# Patient Record
Sex: MALE | Race: WHITE | Employment: UNEMPLOYED | URBAN - METROPOLITAN AREA
[De-identification: names, ages, dates, MRNs, and addresses within clinical notes are randomized per-mention and may not be internally consistent; named-entity substitution may affect disease eponyms.]

---

## 2022-06-28 ENCOUNTER — HOSPITAL ENCOUNTER (EMERGENCY)
Age: 47
Discharge: HOME | End: 2022-06-29
Payer: COMMERCIAL

## 2022-06-28 DIAGNOSIS — R19.7 DIARRHEA, UNSPECIFIED TYPE: ICD-10-CM

## 2022-06-28 DIAGNOSIS — R73.9 HYPERGLYCEMIA: Primary | ICD-10-CM

## 2022-06-28 LAB
A/G RATIO: 0.9 (ref 1.1–2.2)
ALBUMIN SERPL-MCNC: 3.6 G/DL (ref 3.4–5)
ALP BLD-CCNC: 136 U/L (ref 40–129)
ALT SERPL-CCNC: 15 U/L (ref 10–40)
ANION GAP SERPL CALCULATED.3IONS-SCNC: 17 MMOL/L (ref 3–16)
AST SERPL-CCNC: 13 U/L (ref 15–37)
BASE EXCESS VENOUS: -1.6 MMOL/L
BASOPHILS ABSOLUTE: 0.1 K/UL (ref 0–0.2)
BASOPHILS RELATIVE PERCENT: 0.5 %
BETA-HYDROXYBUTYRATE: 0.57 MMOL/L (ref 0–0.27)
BILIRUB SERPL-MCNC: 0.8 MG/DL (ref 0–1)
BILIRUBIN URINE: NEGATIVE
BLOOD, URINE: NEGATIVE
BUN BLDV-MCNC: 20 MG/DL (ref 7–20)
CALCIUM SERPL-MCNC: 9.1 MG/DL (ref 8.3–10.6)
CARBOXYHEMOGLOBIN: 4.4 %
CHLORIDE BLD-SCNC: 87 MMOL/L (ref 99–110)
CLARITY: CLEAR
CO2: 18 MMOL/L (ref 21–32)
COLOR: YELLOW
CREAT SERPL-MCNC: 1 MG/DL (ref 0.9–1.3)
EOSINOPHILS ABSOLUTE: 0.1 K/UL (ref 0–0.6)
EOSINOPHILS RELATIVE PERCENT: 0.6 %
GFR AFRICAN AMERICAN: >60
GFR NON-AFRICAN AMERICAN: >60
GLUCOSE BLD-MCNC: 674 MG/DL (ref 70–99)
GLUCOSE URINE: >=1000 MG/DL
HCO3 VENOUS: 22 MMOL/L (ref 23–29)
HCT VFR BLD CALC: 47.6 % (ref 40.5–52.5)
HEMOGLOBIN: 16.2 G/DL (ref 13.5–17.5)
KETONES, URINE: ABNORMAL MG/DL
LEUKOCYTE ESTERASE, URINE: NEGATIVE
LIPASE: 34 U/L (ref 13–60)
LYMPHOCYTES ABSOLUTE: 1.5 K/UL (ref 1–5.1)
LYMPHOCYTES RELATIVE PERCENT: 12.6 %
MCH RBC QN AUTO: 31 PG (ref 26–34)
MCHC RBC AUTO-ENTMCNC: 34 G/DL (ref 31–36)
MCV RBC AUTO: 91.2 FL (ref 80–100)
METHEMOGLOBIN VENOUS: 0.5 %
MICROSCOPIC EXAMINATION: ABNORMAL
MONOCYTES ABSOLUTE: 1 K/UL (ref 0–1.3)
MONOCYTES RELATIVE PERCENT: 8.9 %
NEUTROPHILS ABSOLUTE: 9 K/UL (ref 1.7–7.7)
NEUTROPHILS RELATIVE PERCENT: 77.4 %
NITRITE, URINE: NEGATIVE
O2 SAT, VEN: 67 %
O2 THERAPY: ABNORMAL
PCO2, VEN: 34.4 MMHG (ref 40–50)
PDW BLD-RTO: 13.2 % (ref 12.4–15.4)
PH UA: 5 (ref 5–8)
PH VENOUS: 7.42 (ref 7.35–7.45)
PLATELET # BLD: 234 K/UL (ref 135–450)
PMV BLD AUTO: 9.1 FL (ref 5–10.5)
PO2, VEN: 33 MMHG
POTASSIUM SERPL-SCNC: 4.2 MMOL/L (ref 3.5–5.1)
PROTEIN UA: NEGATIVE MG/DL
RBC # BLD: 5.22 M/UL (ref 4.2–5.9)
SODIUM BLD-SCNC: 122 MMOL/L (ref 136–145)
SPECIFIC GRAVITY UA: 1.04 (ref 1–1.03)
TCO2 CALC VENOUS: 23 MMOL/L
TOTAL PROTEIN: 7.4 G/DL (ref 6.4–8.2)
TROPONIN: <0.01 NG/ML
URINE REFLEX TO CULTURE: ABNORMAL
URINE TYPE: ABNORMAL
UROBILINOGEN, URINE: 0.2 E.U./DL
WBC # BLD: 11.6 K/UL (ref 4–11)

## 2022-06-28 PROCEDURE — 82803 BLOOD GASES ANY COMBINATION: CPT

## 2022-06-28 PROCEDURE — 85025 COMPLETE CBC W/AUTO DIFF WBC: CPT

## 2022-06-28 PROCEDURE — 2580000003 HC RX 258: Performed by: PHYSICIAN ASSISTANT

## 2022-06-28 PROCEDURE — 81003 URINALYSIS AUTO W/O SCOPE: CPT

## 2022-06-28 PROCEDURE — 99284 EMERGENCY DEPT VISIT MOD MDM: CPT

## 2022-06-28 PROCEDURE — 96374 THER/PROPH/DIAG INJ IV PUSH: CPT

## 2022-06-28 PROCEDURE — 82010 KETONE BODYS QUAN: CPT

## 2022-06-28 PROCEDURE — 84484 ASSAY OF TROPONIN QUANT: CPT

## 2022-06-28 PROCEDURE — 80053 COMPREHEN METABOLIC PANEL: CPT

## 2022-06-28 PROCEDURE — 83690 ASSAY OF LIPASE: CPT

## 2022-06-28 PROCEDURE — 6370000000 HC RX 637 (ALT 250 FOR IP): Performed by: PHYSICIAN ASSISTANT

## 2022-06-28 PROCEDURE — 36415 COLL VENOUS BLD VENIPUNCTURE: CPT

## 2022-06-28 PROCEDURE — 93005 ELECTROCARDIOGRAM TRACING: CPT | Performed by: PHYSICIAN ASSISTANT

## 2022-06-28 RX ORDER — 0.9 % SODIUM CHLORIDE 0.9 %
1000 INTRAVENOUS SOLUTION INTRAVENOUS ONCE
Status: COMPLETED | OUTPATIENT
Start: 2022-06-28 | End: 2022-06-29

## 2022-06-28 RX ADMIN — SODIUM CHLORIDE 1000 ML: 9 INJECTION, SOLUTION INTRAVENOUS at 23:03

## 2022-06-28 RX ADMIN — SODIUM CHLORIDE 1000 ML: 9 INJECTION, SOLUTION INTRAVENOUS at 23:09

## 2022-06-28 RX ADMIN — SODIUM CHLORIDE 1000 ML: 9 INJECTION, SOLUTION INTRAVENOUS at 23:43

## 2022-06-28 RX ADMIN — INSULIN HUMAN 10 UNITS: 100 INJECTION, SOLUTION PARENTERAL at 23:41

## 2022-06-28 ASSESSMENT — PAIN - FUNCTIONAL ASSESSMENT: PAIN_FUNCTIONAL_ASSESSMENT: 0-10

## 2022-06-28 ASSESSMENT — PAIN DESCRIPTION - PAIN TYPE: TYPE: ACUTE PAIN

## 2022-06-28 ASSESSMENT — PAIN DESCRIPTION - DESCRIPTORS: DESCRIPTORS: DISCOMFORT

## 2022-06-28 ASSESSMENT — PAIN DESCRIPTION - LOCATION: LOCATION: ABDOMEN

## 2022-06-28 ASSESSMENT — PAIN SCALES - GENERAL: PAINLEVEL_OUTOF10: 8

## 2022-06-29 VITALS
WEIGHT: 199.52 LBS | HEART RATE: 85 BPM | RESPIRATION RATE: 18 BRPM | TEMPERATURE: 98.4 F | BODY MASS INDEX: 27.02 KG/M2 | DIASTOLIC BLOOD PRESSURE: 92 MMHG | SYSTOLIC BLOOD PRESSURE: 147 MMHG | HEIGHT: 72 IN | OXYGEN SATURATION: 98 %

## 2022-06-29 LAB
ANION GAP SERPL CALCULATED.3IONS-SCNC: 12 MMOL/L (ref 3–16)
BUN BLDV-MCNC: 18 MG/DL (ref 7–20)
CALCIUM SERPL-MCNC: 7.9 MG/DL (ref 8.3–10.6)
CHLORIDE BLD-SCNC: 103 MMOL/L (ref 99–110)
CO2: 17 MMOL/L (ref 21–32)
CREAT SERPL-MCNC: 0.8 MG/DL (ref 0.9–1.3)
EKG ATRIAL RATE: 114 BPM
EKG DIAGNOSIS: NORMAL
EKG P AXIS: 82 DEGREES
EKG P-R INTERVAL: 142 MS
EKG Q-T INTERVAL: 334 MS
EKG QRS DURATION: 74 MS
EKG QTC CALCULATION (BAZETT): 460 MS
EKG R AXIS: 65 DEGREES
EKG T AXIS: 42 DEGREES
EKG VENTRICULAR RATE: 114 BPM
GFR AFRICAN AMERICAN: >60
GFR NON-AFRICAN AMERICAN: >60
GLUCOSE BLD-MCNC: 292 MG/DL (ref 70–99)
GLUCOSE BLD-MCNC: 302 MG/DL (ref 70–99)
PERFORMED ON: ABNORMAL
POTASSIUM REFLEX MAGNESIUM: 3.6 MMOL/L (ref 3.5–5.1)
SODIUM BLD-SCNC: 132 MMOL/L (ref 136–145)

## 2022-06-29 PROCEDURE — 36415 COLL VENOUS BLD VENIPUNCTURE: CPT

## 2022-06-29 PROCEDURE — 80048 BASIC METABOLIC PNL TOTAL CA: CPT

## 2022-06-29 PROCEDURE — 93010 ELECTROCARDIOGRAM REPORT: CPT | Performed by: INTERNAL MEDICINE

## 2022-06-29 ASSESSMENT — ENCOUNTER SYMPTOMS
NAUSEA: 0
VOMITING: 0
SHORTNESS OF BREATH: 0
COLOR CHANGE: 0
ABDOMINAL PAIN: 0
DIARRHEA: 1
BACK PAIN: 0

## 2022-06-29 NOTE — ED PROVIDER NOTES
Gastrointestinal: Positive for diarrhea. Negative for abdominal pain, nausea and vomiting. Genitourinary: Negative for dysuria. Musculoskeletal: Negative for back pain. Skin: Negative for color change, rash and wound. Neurological: Positive for light-headedness. Psychiatric/Behavioral: Negative for agitation, behavioral problems and confusion. Except as noted above the remainder of the review of systems was reviewed and negative. PAST MEDICAL HISTORY         Diagnosis Date    Nicotine abuse     Pulmonary nodule     Right shoulder pain     SOB (shortness of breath)        SURGICAL HISTORY     History reviewed. No pertinent surgical history. CURRENT MEDICATIONS       Discharge Medication List as of 6/29/2022  2:07 AM      CONTINUE these medications which have NOT CHANGED    Details   albuterol (PROVENTIL HFA;VENTOLIN HFA) 108 (90 BASE) MCG/ACT inhaler Inhale 2 puffs into the lungs every 6 hours as needed for WheezingHistorical Med      levothyroxine (SYNTHROID) 88 MCG tablet Take 88 mcg by mouth DailyHistorical Med      Amoxicillin-Pot Clavulanate (AUGMENTIN PO) Take by mouthHistorical Med             ALLERGIES     Ciprofloxacin, Ketorolac tromethamine, Cephalexin, Tramadol, Ibuprofen, and Other    FAMILY HISTORY     History reviewed. No pertinent family history. No family status information on file. SOCIAL HISTORY      reports that he has been smoking. He has never used smokeless tobacco. He reports previous alcohol use. PHYSICAL EXAM    (up to 7 for level 4, 8 or more for level 5)     ED Triage Vitals [06/28/22 2121]   BP Temp Temp Source Heart Rate Resp SpO2 Height Weight   (!) 138/90 98.4 °F (36.9 °C) Oral (!) 107 18 100 % 6' (1.829 m) 199 lb 8.3 oz (90.5 kg)       Physical Exam  Vitals and nursing note reviewed. Constitutional:       Appearance: Normal appearance. HENT:      Head: Normocephalic and atraumatic.       Mouth/Throat:      Mouth: Mucous membranes are moist. Eyes:      Pupils: Pupils are equal, round, and reactive to light. Cardiovascular:      Rate and Rhythm: Tachycardia present. Pulmonary:      Effort: Pulmonary effort is normal. No respiratory distress. Abdominal:      Tenderness: There is no abdominal tenderness. There is no guarding or rebound. Musculoskeletal:         General: No swelling. Normal range of motion. Cervical back: Normal range of motion. Skin:     General: Skin is warm. Neurological:      General: No focal deficit present. Mental Status: He is alert and oriented to person, place, and time. Cranial Nerves: No cranial nerve deficit. Motor: No weakness. Gait: Gait normal.   Psychiatric:         Mood and Affect: Mood normal.         Behavior: Behavior normal.         DIAGNOSTIC RESULTS     EKG: All EKG's are interpreted by SILKE Olguin in the absence of a cardiologist.    EKG interpreted by myself - please refer to attending physician's note for complete EKG interpretation:    Rhythm: sinus rhythm   No evidence of acute ischemia or injury.     LABS:  Labs Reviewed   CBC WITH AUTO DIFFERENTIAL - Abnormal; Notable for the following components:       Result Value    WBC 11.6 (*)     Neutrophils Absolute 9.0 (*)     All other components within normal limits   COMPREHENSIVE METABOLIC PANEL - Abnormal; Notable for the following components:    Sodium 122 (*)     Chloride 87 (*)     CO2 18 (*)     Anion Gap 17 (*)     Glucose 674 (*)     Albumin/Globulin Ratio 0.9 (*)     Alkaline Phosphatase 136 (*)     AST 13 (*)     All other components within normal limits    Narrative:     Vivienne Mann tel. [phone],  Chemistry results called to and read back by Darleen Peres RN, 06/28/2022  22:40, by [address] - Abnormal; Notable for the following components:    Glucose, Ur >=1000 (*)     Ketones, Urine TRACE (*)     All other components within normal limits   BLOOD GAS, VENOUS - Abnormal; Notable for the following components:    pCO2, Memo 34.4 (*)     HCO3, Venous 22 (*)     All other components within normal limits   BETA-HYDROXYBUTYRATE - Abnormal; Notable for the following components:    Beta-Hydroxybutyrate 0.57 (*)     All other components within normal limits   BASIC METABOLIC PANEL W/ REFLEX TO MG FOR LOW K - Abnormal; Notable for the following components:    Sodium 132 (*)     CO2 17 (*)     Glucose 302 (*)     CREATININE 0.8 (*)     Calcium 7.9 (*)     All other components within normal limits   POCT GLUCOSE - Abnormal; Notable for the following components:    POC Glucose 292 (*)     All other components within normal limits   GASTROINTESTINAL PANEL, MOLECULAR   LIPASE    Narrative:     Misha Hobbs tel. [phone],  Chemistry results called to and read back by Vivek Barfield RN, 06/28/2022  22:40, by [facility]   TROPONIN       All other labs were within normal range or not returned as of this dictation. EMERGENCY DEPARTMENT COURSE and DIFFERENTIAL DIAGNOSIS/MDM:   Vitals:    Vitals:    06/28/22 2121 06/29/22 0159 06/29/22 0205   BP: (!) 138/90  (!) 147/92   Pulse: (!) 107 96 85   Resp: 18  18   Temp: 98.4 °F (36.9 °C)     TempSrc: Oral     SpO2: 100%  98%   Weight: 199 lb 8.3 oz (90.5 kg)     Height: 6' (1.829 m)       Patient's pulse initially 107 on recheck is 80. Afebrile not hypoxic. Complaining of frequent persistent diarrhea. His blood sugar was found to be over 600. His anion gap was elevated at 17 and CO2 was 18. Blood pH was normal.  He did have trace ketones in his urine. He was given fluids. Advised patient that he is showing signs of DKA and severe dehydration and that he should be admitted to the hospital.  The patient does not want to stay in the hospital despite my explanation that he can be come very sick and die or suffer permanent disability. He continues to wish to leave. I did repeat his BMP with an improved anion gap of 12. Blood sugars come down to 300. We will reinitiate his metformin instruct him to follow-up with primary care and return for new, worsening or other concerns. CONSULTS:  None    PROCEDURES:  Procedures      FINAL IMPRESSION      1. Hyperglycemia    2.  Diarrhea, unspecified type          DISPOSITION/PLAN   DISPOSITION Decision To Discharge 06/29/2022 01:57:21 AM      PATIENT REFERRED TO:  [facility]) Pre-Services  [phone]          DISCHARGE MEDICATIONS:  Discharge Medication List as of 6/29/2022  2:07 AM      START taking these medications    Details   metFORMIN (GLUCOPHAGE) 500 MG tablet Take 1 tablet by mouth 2 times daily (with meals), Disp-60 tablet, R-0Print             (Please note that portions of this note were completed with a voice recognition program.  Efforts were made to edit the dictations but occasionally words are mis-transcribed.)    Joni Welsh, [address], [place]  06/29/22 6386

## 2022-10-28 ENCOUNTER — APPOINTMENT (OUTPATIENT)
Dept: GENERAL RADIOLOGY | Age: 47
End: 2022-10-28
Payer: COMMERCIAL

## 2022-10-28 ENCOUNTER — HOSPITAL ENCOUNTER (EMERGENCY)
Age: 47
Discharge: HOME | End: 2022-10-28
Payer: COMMERCIAL

## 2022-10-28 VITALS
HEART RATE: 95 BPM | HEIGHT: 72 IN | WEIGHT: 210.1 LBS | SYSTOLIC BLOOD PRESSURE: 138 MMHG | DIASTOLIC BLOOD PRESSURE: 87 MMHG | BODY MASS INDEX: 28.46 KG/M2 | OXYGEN SATURATION: 98 % | TEMPERATURE: 97.2 F | RESPIRATION RATE: 16 BRPM

## 2022-10-28 DIAGNOSIS — S62.339A CLOSED BOXER'S FRACTURE, INITIAL ENCOUNTER: Primary | ICD-10-CM

## 2022-10-28 PROCEDURE — 99283 EMERGENCY DEPT VISIT LOW MDM: CPT

## 2022-10-28 PROCEDURE — 73130 X-RAY EXAM OF HAND: CPT

## 2022-10-28 PROCEDURE — 26750 TREAT FINGER FRACTURE EACH: CPT

## 2022-10-28 ASSESSMENT — PAIN DESCRIPTION - LOCATION: LOCATION: HAND

## 2022-10-28 ASSESSMENT — PAIN SCALES - GENERAL: PAINLEVEL_OUTOF10: 10

## 2022-10-28 ASSESSMENT — LIFESTYLE VARIABLES: HOW OFTEN DO YOU HAVE A DRINK CONTAINING ALCOHOL: NEVER

## 2022-10-28 ASSESSMENT — PAIN DESCRIPTION - DESCRIPTORS: DESCRIPTORS: ACHING

## 2022-10-28 ASSESSMENT — PAIN DESCRIPTION - ORIENTATION: ORIENTATION: RIGHT

## 2022-10-28 ASSESSMENT — PAIN - FUNCTIONAL ASSESSMENT: PAIN_FUNCTIONAL_ASSESSMENT: 0-10
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
## 2022-11-01 ENCOUNTER — TELEPHONE (OUTPATIENT)
Dept: ORTHOPEDIC SURGERY | Age: 47
End: 2022-11-01

## 2022-11-01 ENCOUNTER — OFFICE VISIT (OUTPATIENT)
Dept: ORTHOPEDIC SURGERY | Age: 47
End: 2022-11-01
Payer: COMMERCIAL

## 2022-11-01 VITALS — WEIGHT: 212.6 LBS | BODY MASS INDEX: 28.79 KG/M2 | HEIGHT: 72 IN | RESPIRATION RATE: 16 BRPM

## 2022-11-01 DIAGNOSIS — S60.221A CONTUSION OF RIGHT HAND, INITIAL ENCOUNTER: Primary | ICD-10-CM

## 2022-11-01 PROCEDURE — G8419 CALC BMI OUT NRM PARAM NOF/U: HCPCS | Performed by: ORTHOPAEDIC SURGERY

## 2022-11-01 PROCEDURE — 99203 OFFICE O/P NEW LOW 30 MIN: CPT | Performed by: ORTHOPAEDIC SURGERY

## 2022-11-01 PROCEDURE — G8427 DOCREV CUR MEDS BY ELIG CLIN: HCPCS | Performed by: ORTHOPAEDIC SURGERY

## 2022-11-01 PROCEDURE — 4004F PT TOBACCO SCREEN RCVD TLK: CPT | Performed by: ORTHOPAEDIC SURGERY

## 2022-11-01 PROCEDURE — G8484 FLU IMMUNIZE NO ADMIN: HCPCS | Performed by: ORTHOPAEDIC SURGERY

## 2022-11-01 NOTE — LETTER
[facility]  [facility] [address] [address]  Phone: [phone]  Fax: [phone]    Swati Mercer MD        November 1, 2022     Patient: Issa Galvan   YOB: 1975   Date of Visit: 11/1/2022       To Whom It May Concern: It is my medical opinion that Moris Myles may return to work on 11/2/2022 with no restrictions. If you have any questions or concerns, please don't hesitate to call.     Sincerely,          Swati Mercer MD
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
## 2022-11-12 ENCOUNTER — APPOINTMENT (OUTPATIENT)
Dept: GENERAL RADIOLOGY | Age: 47
End: 2022-11-12
Payer: COMMERCIAL

## 2022-11-12 ENCOUNTER — HOSPITAL ENCOUNTER (EMERGENCY)
Age: 47
Discharge: LEFT BEFORE DISCHARGE | End: 2022-11-12

## 2022-11-12 ENCOUNTER — HOSPITAL ENCOUNTER (EMERGENCY)
Age: 47
Discharge: HOME | End: 2022-11-12
Attending: EMERGENCY MEDICINE
Payer: COMMERCIAL

## 2022-11-12 VITALS
WEIGHT: 212 LBS | HEART RATE: 89 BPM | OXYGEN SATURATION: 99 % | HEIGHT: 72 IN | BODY MASS INDEX: 28.71 KG/M2 | RESPIRATION RATE: 16 BRPM | SYSTOLIC BLOOD PRESSURE: 174 MMHG | DIASTOLIC BLOOD PRESSURE: 89 MMHG | TEMPERATURE: 97.9 F

## 2022-11-12 DIAGNOSIS — L03.113 CELLULITIS OF RIGHT HAND: Primary | ICD-10-CM

## 2022-11-12 PROCEDURE — 99283 EMERGENCY DEPT VISIT LOW MDM: CPT

## 2022-11-12 PROCEDURE — 73130 X-RAY EXAM OF HAND: CPT

## 2022-11-12 PROCEDURE — 6370000000 HC RX 637 (ALT 250 FOR IP): Performed by: EMERGENCY MEDICINE

## 2022-11-12 RX ORDER — CLINDAMYCIN HYDROCHLORIDE 150 MG/1
450 CAPSULE ORAL ONCE
Status: COMPLETED | OUTPATIENT
Start: 2022-11-12 | End: 2022-11-12

## 2022-11-12 RX ORDER — CARBAMAZEPINE 200 MG/1
TABLET ORAL
COMMUNITY
Start: 2022-08-12

## 2022-11-12 RX ORDER — HYDROCODONE BITARTRATE AND ACETAMINOPHEN 5; 325 MG/1; MG/1
1 TABLET ORAL ONCE
Status: COMPLETED | OUTPATIENT
Start: 2022-11-12 | End: 2022-11-12

## 2022-11-12 RX ORDER — CLINDAMYCIN HYDROCHLORIDE 150 MG/1
450 CAPSULE ORAL 3 TIMES DAILY
Qty: 63 CAPSULE | Refills: 0 | Status: SHIPPED | OUTPATIENT
Start: 2022-11-12 | End: 2022-11-19

## 2022-11-12 RX ADMIN — CLINDAMYCIN HYDROCHLORIDE 450 MG: 150 CAPSULE ORAL at 20:13

## 2022-11-12 RX ADMIN — HYDROCODONE BITARTRATE AND ACETAMINOPHEN 1 TABLET: 5; 325 TABLET ORAL at 20:12

## 2022-11-12 ASSESSMENT — PAIN DESCRIPTION - PAIN TYPE
TYPE: ACUTE PAIN
TYPE: ACUTE PAIN

## 2022-11-12 ASSESSMENT — PAIN - FUNCTIONAL ASSESSMENT
PAIN_FUNCTIONAL_ASSESSMENT: PREVENTS OR INTERFERES SOME ACTIVE ACTIVITIES AND ADLS
PAIN_FUNCTIONAL_ASSESSMENT: 0-10
PAIN_FUNCTIONAL_ASSESSMENT: ACTIVITIES ARE NOT PREVENTED
PAIN_FUNCTIONAL_ASSESSMENT: 0-10

## 2022-11-12 ASSESSMENT — PAIN SCALES - GENERAL
PAINLEVEL_OUTOF10: 10

## 2022-11-12 ASSESSMENT — PAIN DESCRIPTION - ORIENTATION
ORIENTATION: RIGHT
ORIENTATION: RIGHT

## 2022-11-12 ASSESSMENT — PAIN DESCRIPTION - LOCATION
LOCATION: HAND
LOCATION: HAND

## 2022-11-12 ASSESSMENT — PAIN DESCRIPTION - FREQUENCY
FREQUENCY: CONTINUOUS
FREQUENCY: CONTINUOUS

## 2022-11-12 ASSESSMENT — PAIN DESCRIPTION - DESCRIPTORS
DESCRIPTORS: ACHING
DESCRIPTORS: ACHING

## 2022-11-12 ASSESSMENT — PAIN DESCRIPTION - ONSET
ONSET: PROGRESSIVE
ONSET: PROGRESSIVE

## 2022-11-13 ASSESSMENT — ENCOUNTER SYMPTOMS: COLOR CHANGE: 1

## 2022-11-13 NOTE — ED PROVIDER NOTES
Emergency Department Provider Note  Location: [facility]  11/12/2022     Patient Identification  Mason Lopes is a 52 y.o. male    Chief Complaint  Hand Injury (Pt states he has a boxer fracture that happened in October, he was treated at [facility] then had a follow up with an Ortho. Pts had is swollen and red with pain 10/10.)      Mode of Arrival  private car    HPI  (History provided by patient)  This is a 52 y.o. male with a PMH significant for boxer's fracture on the right  presented today for right hand swelling, redness, and pain. Patient states he was diagnosed with a boxer's fracture about 2 weeks ago. He followed-up with an orthopedic surgeon on November 1 and was given the clearance to go back to work. That evening, he actually struck his hand against a box but he did not appreciate any puncture wound. He states the day after, redness developed and the swelling got worse over the past 2 weeks. Patient states the swelling has improved in the past 3 days but the pain has gotten worse. ROS  Review of Systems   Musculoskeletal:  Positive for arthralgias (right hand pain). Skin:  Positive for color change. Negative for wound. Neurological:  Negative for weakness. I have reviewed the following nursing documentation:  Allergies: Allergies   Allergen Reactions    Ciprofloxacin Anaphylaxis    Ketorolac Tromethamine Anaphylaxis    Cephalexin      Bloody diarrhea    Tramadol Hives    Ibuprofen Hives and Nausea And Vomiting    Other Nausea And Vomiting       Past medical history:  has a past medical history of Nicotine abuse, Pulmonary nodule, Right shoulder pain, and SOB (shortness of breath). Past surgical history:  has a past surgical history that includes Hand surgery (Right, 2001); Cholesteatoma excision; and knee surgery (Right). Home medications:   Prior to Admission medications    Medication Sig Start Date End Date Taking?  Authorizing Provider   carBAMazepine (TEGRETOL) 200 MG tablet TAKE 2 TABLETS BY MOUTH TWICE A DAY 8/12/22   Historical Provider, MD   metFORMIN (GLUCOPHAGE) 500 MG tablet Take 1 tablet by mouth 2 times daily (with meals) 6/29/22   SILKE Goldberg   albuterol (PROVENTIL HFA;VENTOLIN HFA) 108 (90 BASE) MCG/ACT inhaler Inhale 2 puffs into the lungs every 6 hours as needed for Wheezing    Historical Provider, MD   levothyroxine (SYNTHROID) 88 MCG tablet Take 88 mcg by mouth Daily    Historical Provider, MD   Amoxicillin-Pot Clavulanate (AUGMENTIN PO) Take by mouth    Historical Provider, MD       Social history:  reports that he has been smoking cigarettes. He started smoking about 37 years ago. He has a 37.00 pack-year smoking history. He has never used smokeless tobacco. He reports current alcohol use. He reports that he does not currently use drugs. Family history:  No family history on file. Exam  ED Triage Vitals   BP Temp Temp Source Heart Rate Resp SpO2 Height Weight   11/12/22 2017 11/12/22 1913 11/12/22 2017 11/12/22 2017 11/12/22 2017 11/12/22 2017 11/12/22 1913 11/12/22 1913   (!) 174/89 97.9 °F (36.6 °C) Oral 89 16 99 % 6' (1.829 m) 212 lb (96.2 kg)   Physical Exam  Vitals and nursing note reviewed. Constitutional:       General: He is not in acute distress. Appearance: Normal appearance. He is well-developed. He is not diaphoretic. HENT:      Head: Normocephalic and atraumatic. Neck:      Trachea: No tracheal deviation. Cardiovascular:      Pulses:           Radial pulses are 2+ on the right side. Pulmonary:      Effort: Pulmonary effort is normal. No respiratory distress. Musculoskeletal:      Right wrist: No swelling or deformity. Normal range of motion. Right hand: Swelling (dorsal side of the right hand) and tenderness present. Decreased range of motion. Normal pulse. Skin:     General: Skin is warm and dry. Capillary Refill: Capillary refill takes less than 2 seconds.       Findings: Erythema (see image below) present. Neurological:      Mental Status: He is alert and oriented to person, place, and time. Cranial Nerves: No dysarthria or facial asymmetry. Sensory: No sensory deficit. Motor: No weakness or abnormal muscle tone. Coordination: Coordination normal.   Psychiatric:         Speech: Speech normal.         Behavior: Behavior is cooperative. MDM/ED Course  ED Medication Orders (From admission, onward)      Start Ordered     Status Ordering Provider    11/12/22 2015 11/12/22 2006  clindamycin (CLEOCIN) capsule 450 mg  ONCE        Question:  Antimicrobial Indications  Answer:  Skin and Soft Tissue Infection    Last MAR action: Given - by Jonathon Wagner on 11/12/22 at 2013 [place] [facility]    11/12/22 2015 11/12/22 2006  HYDROcodone-acetaminophen ([address]) 5-325 MG per tablet 1 tablet  ONCE         Last MAR action: Given - by Jonathon Wagner on 11/12/22 at 2012 [place] [facility]            Radiology  XR HAND RIGHT (MIN 3 VIEWS)    Result Date: 11/12/2022  EXAMINATION: THREE XRAY VIEWS OF THE RIGHT HAND 11/12/2022 7:23 pm COMPARISON: None. HISTORY: ORDERING SYSTEM PROVIDED HISTORY: right hand injury TECHNOLOGIST PROVIDED HISTORY: Reason for exam:->right hand injury Reason for Exam: previous boxers fx, now pain and red with swelling FINDINGS: There is severe soft tissue swelling over the dorsal aspect of the hand. No acute fracture or dislocation is identified. No bony erosions are seen. Joint spaces are preserved. Severe soft tissue swelling the dorsal aspect of the hand. Consider cross-sectional imaging to rule out presence of abscess. - Patient seen and evaluated in room 8.  52 y.o. male presented for right hand pain, swelling, redness. Exam concerning for cellulitis. Overall, the entire area was soft, no induration. Due to amount of swelling, I performed bedside ultrasound to look for drainable fluid collection.     Procedure Note: Bedside Ultrasound of Soft Tissue  A limited, bedside ultrasound of the right hand was performed by myself using a linear probe. The medical necessity was to evaluate the soft tissue for the presence or absence of abscess. The structure studied was the soft tissue and bone of the right hand. The interpretation is as follows: soft tissue edema without drainable fluid collection.     - I discussed the results with patient. We agreed to initiate antibiotic and close f/u with ortho. The area of cellulitis was also marked with a skin marker as pictured above. I instructed patient to return if the redness extends beyond the marker despite antibiotic.  - Return precautions also discussed. patient verbalized understanding of care plan and agreed to follow-up with ortho as advised. - Is this patient to be included in the SEP-1 Core Measure due to severe sepsis or septic shock? No   Exclusion criteria - the patient is NOT to be included for SEP-1 Core Measure due to:  2+ SIRS criteria are not met    I estimate there is LOW risk for ABSCESS, COMPARTMENT SYNDROME, NECROTIZING FASCIITIS, TENDON OR NEUROVASCULAR INJURY, or FOREIGN BODY, thus I consider the discharge disposition reasonable. Also, there is no evidence or peritonitis, sepsis, or toxicity. Hansel Verde and I have discussed the diagnosis and risks, and we agree with discharging home to follow-up with [facility]. We also discussed returning to the Emergency Department immediately if new or worsening symptoms occur. We have discussed the symptoms which are most concerning (e.g., changing or worsening pain, fever, numbness, weakness, cool or painful digits) that necessitate immediate return. Clinical Impression:  1. Cellulitis of right hand          Disposition:  Discharge to home in good condition. Blood pressure (!) 174/89, pulse 89, temperature 97.9 °F (36.6 °C), temperature source Oral, resp. rate 16, height 6' (1.829 m), weight 212 lb (96.2 kg), SpO2 99 %.     Patient was given scripts for the following medications. I counseled patient how to take these medications. Discharge Medication List as of 11/12/2022  8:24 PM        START taking these medications    Details   clindamycin (CLEOCIN) 150 MG capsule Take 3 capsules by mouth 3 times daily for 7 days, Disp-63 capsule, R-0Normal             Disposition referral (if applicable):  Benigno Marquez MD  [address] [place] [address]. [place] [address]    Schedule an appointment as soon as possible for a visit in 3 days        Total critical care time is 0 minutes, which excludes separately billable procedures and updating family. Time spent is specifically for management of the presenting complaint and symptoms initially, direct bedside care, reevaluation, review of records, and consultation. There was a high probability of clinically significant life-threatening deterioration in the patient's condition, which required my urgent intervention. This chart was generated in part by using Dragon Dictation system and may contain errors related to that system including errors in grammar, punctuation, and spelling, as well as words and phrases that may be inappropriate. If there are any questions or concerns please feel free to contact the dictating provider for clarification.      Larissa Vasquez MD  [facility] Radha Alonso MD  11/13/22 1864

## 2025-01-31 ENCOUNTER — OFFICE VISIT (OUTPATIENT)
Age: 50
End: 2025-01-31

## 2025-01-31 VITALS
HEIGHT: 72 IN | HEART RATE: 98 BPM | TEMPERATURE: 98.1 F | BODY MASS INDEX: 30.2 KG/M2 | WEIGHT: 223 LBS | OXYGEN SATURATION: 98 %

## 2025-01-31 DIAGNOSIS — L97.509: ICD-10-CM

## 2025-01-31 DIAGNOSIS — S91.109A AVULSION OF TOE, INITIAL ENCOUNTER: Primary | ICD-10-CM

## 2025-01-31 RX ORDER — BACITRACIN ZINC AND POLYMYXIN B SULFATE 500; 1000 [USP'U]/G; [USP'U]/G
OINTMENT TOPICAL
Qty: 15 G | Refills: 1 | Status: SHIPPED | OUTPATIENT
Start: 2025-01-31 | End: 2025-01-31

## 2025-01-31 RX ORDER — SULFAMETHOXAZOLE AND TRIMETHOPRIM 800; 160 MG/1; MG/1
1 TABLET ORAL 2 TIMES DAILY
Qty: 20 TABLET | Refills: 0 | Status: SHIPPED | OUTPATIENT
Start: 2025-01-31 | End: 2025-01-31

## 2025-01-31 RX ORDER — SULFAMETHOXAZOLE AND TRIMETHOPRIM 800; 160 MG/1; MG/1
1 TABLET ORAL 2 TIMES DAILY
Qty: 20 TABLET | Refills: 0 | Status: SHIPPED | OUTPATIENT
Start: 2025-01-31 | End: 2025-02-10

## 2025-01-31 RX ORDER — BACITRACIN ZINC AND POLYMYXIN B SULFATE 500; 1000 [USP'U]/G; [USP'U]/G
OINTMENT TOPICAL
Qty: 15 G | Refills: 1 | Status: SHIPPED | OUTPATIENT
Start: 2025-01-31 | End: 2025-02-07

## 2025-02-01 NOTE — PROGRESS NOTES
[facility]  Faisal Oliver (:  1975 MRN: [id number]) is a 49 y.o. male, here for evaluation of the following chief complaint(s):  Toe Pain (Pt c/o blister on his left toe, c/o pain and swelling for about a week.)    ASSESSMENT/PLAN:    ICD-10-CM    1. Avulsion of toe, initial encounter  S91.109A       2. Chronic ulcer of toe, unspecified laterality, unspecified ulcer stage ([facility])  L97.509           New Prescriptions    BACITRACIN-POLYMYXIN B (POLYSPORIN) 500-61049 UNIT/GM OINTMENT    Apply topically 2 times daily.    SULFAMETHOXAZOLE-TRIMETHOPRIM (BACTRIM DS;SEPTRA DS) 800-160 MG PER TABLET    Take 1 tablet by mouth 2 times daily for 10 days     Summary  - I cleaned the area with chlorhexidine, applied triple antibiotic ointment, applied a non-adherent sterile pad, and then wrapped with gauze.  Patient tolerated procedure well.  - Follow up as needed.  - change socks when wet.   - I explained wound care instructions.   - I explained the warning signs of when to go to the emergency room.  Differentials: Herpes Zoster, Cellulitis, Atopic Dermatitis, Tinea Corporis, Scabies or other insect/parasite, Contact Dermatitis.    SUBJECTIVE/OBJECTIVE:  HPI    Vitals:    25 1939   Pulse: 98   Temp: 98.1 °F (36.7 °C)   TempSrc: Oral   SpO2: 98%   Weight: 101.2 kg (223 lb)   Height: 1.829 m (6')     PHYSICAL EXAM  Physical Exam  Nursing note reviewed.   Constitutional:       General: He is not in acute distress.     Appearance: He is not toxic-appearing.   HENT:      Head: Atraumatic.      Right Ear: External ear normal.      Nose: Nose normal.   Eyes:      General:         Right eye: No discharge.         Left eye: No discharge.      Conjunctiva/sclera: Conjunctivae normal.   Pulmonary:      Effort: No respiratory distress.   Musculoskeletal:      Cervical back: Neck supple.        Feet:    Skin:     Comments:   Affected area is edematous, erythematous, without discharge, as depicted (markings on

## 2025-02-07 ENCOUNTER — APPOINTMENT (OUTPATIENT)
Dept: GENERAL RADIOLOGY | Age: 50
DRG: 380 | End: 2025-02-07
Payer: COMMERCIAL

## 2025-02-07 ENCOUNTER — HOSPITAL ENCOUNTER (EMERGENCY)
Age: 50
Discharge: HOME | DRG: 380 | End: 2025-02-07
Attending: STUDENT IN AN ORGANIZED HEALTH CARE EDUCATION/TRAINING PROGRAM
Payer: COMMERCIAL

## 2025-02-07 ENCOUNTER — HOSPITAL ENCOUNTER (INPATIENT)
Age: 50
LOS: 1 days | Discharge: LEFT BEFORE DISCHARGE | DRG: 380 | End: 2025-02-08
Attending: INTERNAL MEDICINE | Admitting: INTERNAL MEDICINE
Payer: COMMERCIAL

## 2025-02-07 VITALS
DIASTOLIC BLOOD PRESSURE: 76 MMHG | TEMPERATURE: 99 F | WEIGHT: 222.66 LBS | HEIGHT: 72 IN | OXYGEN SATURATION: 98 % | HEART RATE: 95 BPM | SYSTOLIC BLOOD PRESSURE: 127 MMHG | RESPIRATION RATE: 17 BRPM | BODY MASS INDEX: 30.16 KG/M2

## 2025-02-07 DIAGNOSIS — Z72.0 TOBACCO USE: ICD-10-CM

## 2025-02-07 DIAGNOSIS — Z86.39 HISTORY OF DIABETES MELLITUS: ICD-10-CM

## 2025-02-07 DIAGNOSIS — M86.9 OSTEOMYELITIS OF LEFT FOOT, UNSPECIFIED TYPE: Primary | ICD-10-CM

## 2025-02-07 LAB
ALBUMIN SERPL-MCNC: 3.7 G/DL (ref 3.4–5)
ALBUMIN/GLOB SERPL: 1.1 {RATIO} (ref 1.1–2.2)
ALP SERPL-CCNC: 114 U/L (ref 40–129)
ALT SERPL-CCNC: 12 U/L (ref 10–40)
ANION GAP SERPL CALCULATED.3IONS-SCNC: 7 MMOL/L (ref 3–16)
AST SERPL-CCNC: 20 U/L (ref 15–37)
BASOPHILS # BLD: 0 K/UL (ref 0–0.2)
BASOPHILS NFR BLD: 0.5 %
BILIRUB SERPL-MCNC: 0.5 MG/DL (ref 0–1)
BUN SERPL-MCNC: 17 MG/DL (ref 7–20)
CALCIUM SERPL-MCNC: 9 MG/DL (ref 8.3–10.6)
CHLORIDE SERPL-SCNC: 105 MMOL/L (ref 99–110)
CO2 SERPL-SCNC: 26 MMOL/L (ref 21–32)
CREAT SERPL-MCNC: 0.9 MG/DL (ref 0.9–1.3)
CRP SERPL-MCNC: <3 MG/L (ref 0–5.1)
DEPRECATED RDW RBC AUTO: 13.2 % (ref 12.4–15.4)
EOSINOPHIL # BLD: 0.2 K/UL (ref 0–0.6)
EOSINOPHIL NFR BLD: 2.8 %
ERYTHROCYTE [SEDIMENTATION RATE] IN BLOOD BY WESTERGREN METHOD: 17 MM/HR (ref 0–15)
GFR SERPLBLD CREATININE-BSD FMLA CKD-EPI: >90 ML/MIN/{1.73_M2}
GLUCOSE BLD-MCNC: 190 MG/DL (ref 70–99)
GLUCOSE SERPL-MCNC: 202 MG/DL (ref 70–99)
HCT VFR BLD AUTO: 40.1 % (ref 40.5–52.5)
HGB BLD-MCNC: 13.5 G/DL (ref 13.5–17.5)
LACTATE BLDV-SCNC: 0.9 MMOL/L (ref 0.4–1.9)
LYMPHOCYTES # BLD: 2.2 K/UL (ref 1–5.1)
LYMPHOCYTES NFR BLD: 34.9 %
MCH RBC QN AUTO: 31 PG (ref 26–34)
MCHC RBC AUTO-ENTMCNC: 33.7 G/DL (ref 31–36)
MCV RBC AUTO: 92 FL (ref 80–100)
MONOCYTES # BLD: 0.7 K/UL (ref 0–1.3)
MONOCYTES NFR BLD: 11.5 %
NEUTROPHILS # BLD: 3.2 K/UL (ref 1.7–7.7)
NEUTROPHILS NFR BLD: 50.3 %
PERFORMED ON: ABNORMAL
PLATELET # BLD AUTO: 192 K/UL (ref 135–450)
PMV BLD AUTO: 8.4 FL (ref 5–10.5)
POTASSIUM SERPL-SCNC: 4 MMOL/L (ref 3.5–5.1)
PROT SERPL-MCNC: 7 G/DL (ref 6.4–8.2)
RBC # BLD AUTO: 4.36 M/UL (ref 4.2–5.9)
SODIUM SERPL-SCNC: 138 MMOL/L (ref 136–145)
WBC # BLD AUTO: 6.3 K/UL (ref 4–11)

## 2025-02-07 PROCEDURE — 73630 X-RAY EXAM OF FOOT: CPT

## 2025-02-07 PROCEDURE — 86140 C-REACTIVE PROTEIN: CPT

## 2025-02-07 PROCEDURE — 83605 ASSAY OF LACTIC ACID: CPT

## 2025-02-07 PROCEDURE — 87040 BLOOD CULTURE FOR BACTERIA: CPT

## 2025-02-07 PROCEDURE — 80053 COMPREHEN METABOLIC PANEL: CPT

## 2025-02-07 PROCEDURE — 36415 COLL VENOUS BLD VENIPUNCTURE: CPT

## 2025-02-07 PROCEDURE — 99283 EMERGENCY DEPT VISIT LOW MDM: CPT

## 2025-02-07 PROCEDURE — 99285 EMERGENCY DEPT VISIT HI MDM: CPT

## 2025-02-07 PROCEDURE — 85652 RBC SED RATE AUTOMATED: CPT

## 2025-02-07 PROCEDURE — 85025 COMPLETE CBC W/AUTO DIFF WBC: CPT

## 2025-02-07 RX ORDER — METRONIDAZOLE 500 MG/1
500 TABLET ORAL ONCE
Status: COMPLETED | OUTPATIENT
Start: 2025-02-07 | End: 2025-02-08

## 2025-02-07 RX ORDER — SULFAMETHOXAZOLE AND TRIMETHOPRIM 800; 160 MG/1; MG/1
1 TABLET ORAL 2 TIMES DAILY
Status: ON HOLD | COMMUNITY
Start: 2025-02-01 | End: 2025-02-08

## 2025-02-07 RX ORDER — HYDROCODONE BITARTRATE AND ACETAMINOPHEN 5; 325 MG/1; MG/1
1 TABLET ORAL ONCE
Status: COMPLETED | OUTPATIENT
Start: 2025-02-07 | End: 2025-02-08

## 2025-02-07 ASSESSMENT — PAIN SCALES - WONG BAKER: WONGBAKER_NUMERICALRESPONSE: NO HURT

## 2025-02-07 ASSESSMENT — PAIN DESCRIPTION - FREQUENCY: FREQUENCY: CONTINUOUS

## 2025-02-07 ASSESSMENT — ENCOUNTER SYMPTOMS
COLOR CHANGE: 1
CHEST TIGHTNESS: 0
COUGH: 0
SORE THROAT: 0
SHORTNESS OF BREATH: 0
NAUSEA: 0
ABDOMINAL PAIN: 0
WHEEZING: 0
VOICE CHANGE: 0
BACK PAIN: 0
VOMITING: 0

## 2025-02-07 ASSESSMENT — PAIN SCALES - GENERAL
PAINLEVEL_OUTOF10: 8
PAINLEVEL_OUTOF10: 10

## 2025-02-07 ASSESSMENT — PAIN DESCRIPTION - ORIENTATION
ORIENTATION: LEFT
ORIENTATION: LEFT

## 2025-02-07 ASSESSMENT — LIFESTYLE VARIABLES
HOW OFTEN DO YOU HAVE A DRINK CONTAINING ALCOHOL: NEVER
HOW MANY STANDARD DRINKS CONTAINING ALCOHOL DO YOU HAVE ON A TYPICAL DAY: PATIENT DOES NOT DRINK

## 2025-02-07 ASSESSMENT — PAIN DESCRIPTION - DESCRIPTORS
DESCRIPTORS: PATIENT UNABLE TO DESCRIBE
DESCRIPTORS: ACHING

## 2025-02-07 ASSESSMENT — PAIN - FUNCTIONAL ASSESSMENT
PAIN_FUNCTIONAL_ASSESSMENT: 0-10
PAIN_FUNCTIONAL_ASSESSMENT: ACTIVITIES ARE NOT PREVENTED
PAIN_FUNCTIONAL_ASSESSMENT: PREVENTS OR INTERFERES SOME ACTIVE ACTIVITIES AND ADLS

## 2025-02-07 ASSESSMENT — PAIN DESCRIPTION - PAIN TYPE
TYPE: ACUTE PAIN
TYPE: ACUTE PAIN

## 2025-02-07 ASSESSMENT — PAIN DESCRIPTION - LOCATION
LOCATION: FOOT
LOCATION: FOOT

## 2025-02-07 ASSESSMENT — PAIN DESCRIPTION - ONSET: ONSET: ON-GOING

## 2025-02-07 NOTE — ED PROVIDER NOTES
[facility]      EMERGENCY MEDICINE     Pt Name: Ascencion Fulton  MRN: [id number]  Birthdate 1975  Date of evaluation: 2/7/2025  Provider: Sony Quintana DO    CHIEF COMPLAINT       Chief Complaint   Patient presents with    Foot Pain     Left foot has been bothering him x2 weeks w/ pain and blisters on the digit 2 through 5. Reports they are raw and painful. Got an ABX x1 week from urgent care and has been taking it but nothing is helping it. HX of type 2 diabetic but is no longer on meds for it d/t weight loss.      HISTORY OF PRESENT ILLNESS   Ascencion Fulton is a 49 y.o. male who presents to the emergency department for left foot pain.  Patient states that for the last 2-week he has been having pain and blisters on the top of his left foot.  States he got antibiotics from urgent care and was told to rub cream on it but states that it has not been helping with the symptoms.  History of T2DM but states he does not to take medications anymore due to weight loss.  He has not been running fevers.  He is adamant that he is not staying here in the emergency department and would like to get antibiotics and leave.        PASTMEDICAL HISTORY     Past Medical History:   Diagnosis Date    Nicotine abuse     Pulmonary nodule     Right shoulder pain     SOB (shortness of breath)        There is no problem list on file for this patient.    SURGICAL HISTORY       Past Surgical History:   Procedure Laterality Date    CHOLESTEATOMA EXCISION      HAND SURGERY Right 2001    tendon surgery    KNEE SURGERY Right     reports 'replaced' patella       CURRENT MEDICATIONS       Previous Medications    ALBUTEROL (PROVENTIL HFA;VENTOLIN HFA) 108 (90 BASE) MCG/ACT INHALER    Inhale 2 puffs into the lungs every 6 hours as needed for Wheezing    CARBAMAZEPINE (TEGRETOL) 200 MG TABLET    TAKE 2 TABLETS BY MOUTH TWICE A DAY    LEVOTHYROXINE (SYNTHROID) 88 MCG TABLET    Take 1 tablet by mouth Daily     critical care out of the total shared critical care time provided     This includes multiple reevaluations, vital sign monitoring, pulse oximetry monitoring, telemetry monitoring, clinical response to the IV medications, reviewing the nursing notes, consultation time, dictation/documentation time, and interpretation of the labwork. (This time excludes time spent performing procedures).       DISCHARGE PRESCRIPTIONS: (None if blank)  New Prescriptions    AMOXICILLIN-CLAVULANATE (AUGMENTIN) 875-125 MG PER TABLET    Take 1 tablet by mouth 2 times daily for 10 days       I am the primary clinician of record.     FINAL IMPRESSION      1. Osteomyelitis of left foot, unspecified type            DISPOSITION/PLAN   DISPOSITION [place] 02/07/2025 05:04:44 PM   DISPOSITION CONDITION Stable           OUTPATIENT FOLLOW UP THE PATIENT:  No follow-up provider specified.      Electronically Signed: Sony Quintana DO, 02/07/25, 5:05 PM    This report has been produced using speech recognition software and may contain errors related to that system including errors in grammar, punctuation, and spelling, as well as words and phrases that may be inappropriate. If there are any questions or concerns please feel free to contact the dictating provider for clarification.       Sony Quintana DO  02/07/25 [id number]

## 2025-02-07 NOTE — DISCHARGE INSTRUCTIONS
You were seen today in the emergency department due to an infection on your left foot.  Your x-ray was concerning for osteomyelitis and you have elected to leave the emergency department AGAINST MEDICAL ADVICE.  Please note that an antibiotic has been sent to your pharmacy, however you will need further workup as well as IV antibiotics to treat your illness.  It is highly recommended that you return to the hospital tonight as we discussed prior to you leaving.  Please return to the emergency department anytime

## 2025-02-07 NOTE — ED NOTES
Followed up with Ascencion Fulton on 2/7/2025 at 5:03 PM. Patient left the ED with a disposition of AMA on . Patient cited personal obligations as reason. Advised patient to follow up with a primary care physician or return to the Emergency Department if symptoms worsen.   Angella Delaney RN

## 2025-02-08 VITALS
DIASTOLIC BLOOD PRESSURE: 84 MMHG | HEART RATE: 79 BPM | TEMPERATURE: 98.1 F | WEIGHT: 222.66 LBS | OXYGEN SATURATION: 96 % | BODY MASS INDEX: 30.2 KG/M2 | RESPIRATION RATE: 17 BRPM | SYSTOLIC BLOOD PRESSURE: 140 MMHG

## 2025-02-08 PROBLEM — E11.65 UNCONTROLLED TYPE 2 DIABETES MELLITUS WITH HYPERGLYCEMIA (HCC): Status: ACTIVE | Noted: 2025-02-08

## 2025-02-08 PROBLEM — R03.0 ELEVATED BLOOD PRESSURE READING WITHOUT DIAGNOSIS OF HYPERTENSION: Status: ACTIVE | Noted: 2025-02-08

## 2025-02-08 PROBLEM — E11.628 DIABETIC FOOT INFECTION (HCC): Status: ACTIVE | Noted: 2025-02-08

## 2025-02-08 PROBLEM — L08.9 DIABETIC FOOT INFECTION (HCC): Status: ACTIVE | Noted: 2025-02-08

## 2025-02-08 LAB
ANION GAP SERPL CALCULATED.3IONS-SCNC: 8 MMOL/L (ref 3–16)
BUN SERPL-MCNC: 16 MG/DL (ref 7–20)
CALCIUM SERPL-MCNC: 8.7 MG/DL (ref 8.3–10.6)
CARBAMAZEPINE DOSE: ABNORMAL MG
CARBAMAZEPINE SERPL-MCNC: <2 UG/ML (ref 4–12)
CHLORIDE SERPL-SCNC: 104 MMOL/L (ref 99–110)
CHOLEST SERPL-MCNC: 148 MG/DL (ref 0–199)
CO2 SERPL-SCNC: 24 MMOL/L (ref 21–32)
CREAT SERPL-MCNC: 0.7 MG/DL (ref 0.9–1.3)
GFR SERPLBLD CREATININE-BSD FMLA CKD-EPI: >90 ML/MIN/{1.73_M2}
GLUCOSE BLD-MCNC: 133 MG/DL (ref 70–99)
GLUCOSE BLD-MCNC: 151 MG/DL (ref 70–99)
GLUCOSE BLD-MCNC: 153 MG/DL (ref 70–99)
GLUCOSE BLD-MCNC: 162 MG/DL (ref 70–99)
GLUCOSE SERPL-MCNC: 195 MG/DL (ref 70–99)
HDLC SERPL-MCNC: 26 MG/DL (ref 40–60)
LDL CHOLESTEROL: 97 MG/DL
PERFORMED ON: ABNORMAL
POTASSIUM SERPL-SCNC: 4.1 MMOL/L (ref 3.5–5.1)
SODIUM SERPL-SCNC: 136 MMOL/L (ref 136–145)
TRIGL SERPL-MCNC: 125 MG/DL (ref 0–150)
TSH SERPL DL<=0.005 MIU/L-ACNC: 3.44 UIU/ML (ref 0.27–4.2)
VLDLC SERPL CALC-MCNC: 25 MG/DL

## 2025-02-08 PROCEDURE — 36415 COLL VENOUS BLD VENIPUNCTURE: CPT

## 2025-02-08 PROCEDURE — 80048 BASIC METABOLIC PNL TOTAL CA: CPT

## 2025-02-08 PROCEDURE — 96367 TX/PROPH/DG ADDL SEQ IV INF: CPT

## 2025-02-08 PROCEDURE — 6370000000 HC RX 637 (ALT 250 FOR IP): Performed by: GENERAL ACUTE CARE HOSPITAL

## 2025-02-08 PROCEDURE — 6360000002 HC RX W HCPCS: Performed by: GENERAL ACUTE CARE HOSPITAL

## 2025-02-08 PROCEDURE — G0378 HOSPITAL OBSERVATION PER HR: HCPCS

## 2025-02-08 PROCEDURE — 99254 IP/OBS CNSLTJ NEW/EST MOD 60: CPT | Performed by: INTERNAL MEDICINE

## 2025-02-08 PROCEDURE — 6360000002 HC RX W HCPCS: Performed by: INTERNAL MEDICINE

## 2025-02-08 PROCEDURE — 94760 N-INVAS EAR/PLS OXIMETRY 1: CPT

## 2025-02-08 PROCEDURE — 6360000002 HC RX W HCPCS

## 2025-02-08 PROCEDURE — 2580000003 HC RX 258: Performed by: INTERNAL MEDICINE

## 2025-02-08 PROCEDURE — 96365 THER/PROPH/DIAG IV INF INIT: CPT

## 2025-02-08 PROCEDURE — 96374 THER/PROPH/DIAG INJ IV PUSH: CPT

## 2025-02-08 PROCEDURE — 2580000003 HC RX 258: Performed by: GENERAL ACUTE CARE HOSPITAL

## 2025-02-08 PROCEDURE — 1200000000 HC SEMI PRIVATE

## 2025-02-08 PROCEDURE — 84443 ASSAY THYROID STIM HORMONE: CPT

## 2025-02-08 PROCEDURE — 80156 ASSAY CARBAMAZEPINE TOTAL: CPT

## 2025-02-08 PROCEDURE — 80061 LIPID PANEL: CPT

## 2025-02-08 PROCEDURE — 96366 THER/PROPH/DIAG IV INF ADDON: CPT

## 2025-02-08 RX ORDER — POTASSIUM CHLORIDE 7.45 MG/ML
10 INJECTION INTRAVENOUS PRN
Status: DISCONTINUED | OUTPATIENT
Start: 2025-02-08 | End: 2025-02-08 | Stop reason: HOSPADM

## 2025-02-08 RX ORDER — SODIUM CHLORIDE 9 MG/ML
INJECTION, SOLUTION INTRAVENOUS PRN
Status: DISCONTINUED | OUTPATIENT
Start: 2025-02-08 | End: 2025-02-08 | Stop reason: HOSPADM

## 2025-02-08 RX ORDER — DEXTROSE MONOHYDRATE 100 MG/ML
INJECTION, SOLUTION INTRAVENOUS CONTINUOUS PRN
Status: DISCONTINUED | OUTPATIENT
Start: 2025-02-08 | End: 2025-02-08 | Stop reason: HOSPADM

## 2025-02-08 RX ORDER — GLUCAGON 1 MG/ML
1 KIT INJECTION PRN
Status: DISCONTINUED | OUTPATIENT
Start: 2025-02-08 | End: 2025-02-08 | Stop reason: HOSPADM

## 2025-02-08 RX ORDER — ACETAMINOPHEN 325 MG/1
650 TABLET ORAL EVERY 6 HOURS PRN
Status: DISCONTINUED | OUTPATIENT
Start: 2025-02-08 | End: 2025-02-08 | Stop reason: HOSPADM

## 2025-02-08 RX ORDER — CARBAMAZEPINE 200 MG/1
400 TABLET ORAL 2 TIMES DAILY
Status: DISCONTINUED | OUTPATIENT
Start: 2025-02-08 | End: 2025-02-08

## 2025-02-08 RX ORDER — INSULIN LISPRO 100 [IU]/ML
0-8 INJECTION, SOLUTION INTRAVENOUS; SUBCUTANEOUS
Status: DISCONTINUED | OUTPATIENT
Start: 2025-02-08 | End: 2025-02-08 | Stop reason: HOSPADM

## 2025-02-08 RX ORDER — LEVOTHYROXINE SODIUM 88 UG/1
88 TABLET ORAL DAILY
Status: DISCONTINUED | OUTPATIENT
Start: 2025-02-08 | End: 2025-02-08

## 2025-02-08 RX ORDER — MUPIROCIN 20 MG/G
OINTMENT TOPICAL 2 TIMES DAILY
Status: DISCONTINUED | OUTPATIENT
Start: 2025-02-08 | End: 2025-02-08 | Stop reason: HOSPADM

## 2025-02-08 RX ORDER — ONDANSETRON 2 MG/ML
4 INJECTION INTRAMUSCULAR; INTRAVENOUS EVERY 6 HOURS PRN
Status: DISCONTINUED | OUTPATIENT
Start: 2025-02-08 | End: 2025-02-08 | Stop reason: HOSPADM

## 2025-02-08 RX ORDER — POTASSIUM CHLORIDE 1500 MG/1
40 TABLET, EXTENDED RELEASE ORAL PRN
Status: DISCONTINUED | OUTPATIENT
Start: 2025-02-08 | End: 2025-02-08 | Stop reason: HOSPADM

## 2025-02-08 RX ORDER — ENOXAPARIN SODIUM 100 MG/ML
30 INJECTION SUBCUTANEOUS 2 TIMES DAILY
Status: DISCONTINUED | OUTPATIENT
Start: 2025-02-08 | End: 2025-02-08 | Stop reason: HOSPADM

## 2025-02-08 RX ORDER — SULFAMETHOXAZOLE AND TRIMETHOPRIM 800; 160 MG/1; MG/1
1 TABLET ORAL 3 TIMES DAILY
Qty: 42 TABLET | Refills: 0 | Status: SHIPPED | OUTPATIENT
Start: 2025-02-08 | End: 2025-02-22

## 2025-02-08 RX ORDER — MAGNESIUM SULFATE IN WATER 40 MG/ML
2000 INJECTION, SOLUTION INTRAVENOUS PRN
Status: DISCONTINUED | OUTPATIENT
Start: 2025-02-08 | End: 2025-02-08 | Stop reason: HOSPADM

## 2025-02-08 RX ORDER — SODIUM CHLORIDE 0.9 % (FLUSH) 0.9 %
5-40 SYRINGE (ML) INJECTION EVERY 12 HOURS SCHEDULED
Status: DISCONTINUED | OUTPATIENT
Start: 2025-02-08 | End: 2025-02-08 | Stop reason: HOSPADM

## 2025-02-08 RX ORDER — ACETAMINOPHEN 650 MG/1
650 SUPPOSITORY RECTAL EVERY 6 HOURS PRN
Status: DISCONTINUED | OUTPATIENT
Start: 2025-02-08 | End: 2025-02-08 | Stop reason: HOSPADM

## 2025-02-08 RX ORDER — LINEZOLID 2 MG/ML
600 INJECTION, SOLUTION INTRAVENOUS EVERY 12 HOURS
Status: DISCONTINUED | OUTPATIENT
Start: 2025-02-08 | End: 2025-02-08 | Stop reason: HOSPADM

## 2025-02-08 RX ORDER — ALBUTEROL SULFATE 90 UG/1
2 INHALANT RESPIRATORY (INHALATION) EVERY 6 HOURS PRN
Status: DISCONTINUED | OUTPATIENT
Start: 2025-02-08 | End: 2025-02-08 | Stop reason: HOSPADM

## 2025-02-08 RX ORDER — POLYETHYLENE GLYCOL 3350 17 G/17G
17 POWDER, FOR SOLUTION ORAL DAILY PRN
Status: DISCONTINUED | OUTPATIENT
Start: 2025-02-08 | End: 2025-02-08 | Stop reason: HOSPADM

## 2025-02-08 RX ORDER — HYDRALAZINE HYDROCHLORIDE 20 MG/ML
10 INJECTION INTRAMUSCULAR; INTRAVENOUS EVERY 6 HOURS PRN
Status: DISCONTINUED | OUTPATIENT
Start: 2025-02-08 | End: 2025-02-08 | Stop reason: HOSPADM

## 2025-02-08 RX ORDER — SODIUM CHLORIDE 0.9 % (FLUSH) 0.9 %
5-40 SYRINGE (ML) INJECTION PRN
Status: DISCONTINUED | OUTPATIENT
Start: 2025-02-08 | End: 2025-02-08 | Stop reason: HOSPADM

## 2025-02-08 RX ORDER — ONDANSETRON 4 MG/1
4 TABLET, ORALLY DISINTEGRATING ORAL EVERY 8 HOURS PRN
Status: DISCONTINUED | OUTPATIENT
Start: 2025-02-08 | End: 2025-02-08 | Stop reason: HOSPADM

## 2025-02-08 RX ADMIN — METRONIDAZOLE 500 MG: 500 TABLET ORAL at 02:48

## 2025-02-08 RX ADMIN — LINEZOLID 600 MG: 600 INJECTION, SOLUTION INTRAVENOUS at 02:49

## 2025-02-08 RX ADMIN — PIPERACILLIN AND TAZOBACTAM 3375 MG: 3; .375 INJECTION, POWDER, LYOPHILIZED, FOR SOLUTION INTRAVENOUS at 13:05

## 2025-02-08 RX ADMIN — HYDROCODONE BITARTRATE AND ACETAMINOPHEN 1 TABLET: 5; 325 TABLET ORAL at 00:01

## 2025-02-08 RX ADMIN — PIPERACILLIN AND TAZOBACTAM 4500 MG: 4; .5 INJECTION, POWDER, FOR SOLUTION INTRAVENOUS at 06:47

## 2025-02-08 RX ADMIN — CEFEPIME 2000 MG: 2 INJECTION, POWDER, FOR SOLUTION INTRAVENOUS at 00:02

## 2025-02-08 ASSESSMENT — PAIN SCALES - GENERAL
PAINLEVEL_OUTOF10: 10
PAINLEVEL_OUTOF10: 0

## 2025-02-08 NOTE — PLAN OF CARE
Problem: Chronic Conditions and Co-morbidities  Goal: Patient's chronic conditions and co-morbidity symptoms are monitored and maintained or improved  2/8/2025 1441 by Kait Cohen RN  Outcome: Progressing

## 2025-02-08 NOTE — PROGRESS NOTES
D: per podiatry, pt okay to d/c on oral antibiotics. A: this RN sent secure message to Dr. Rose concerning this R: Dr. Rose waiting on ID to make recommendations concerning antibiotics

## 2025-02-08 NOTE — CONSULTS
Diabetic peripheral neuropathy with diabetic foot ulcerations  Left foot ulcerations depth to deep fascia  Major osseous defect distal phalanx, bilaterally  Osteomyelitis versus chronic traumatic changes  Cellulitis  Tinea pedis    PLAN:  Evaluation and Management x 30 minutes with greater than 50% of the time spent with the patient discussing the etiology and treatment options of the chief complaint.    1.  Left foot infection  Cellulitis resolved  Open wounds deep fascia exposed second and third digit  Mupirocin ordered for daily application  Betadine wet-to-dry dressing applied today for interdigital maceration  Suspect underlying tinea pedis as etiology for the initial presentation    2.  Left hallux osteomyelitis  Agree there is erosive changes at the distal phalanx of the hallux on review of the x-rays; however, these appear to be chronic given the clinical presentation no open wound no evidence of edema or erythema to suggest underlying process.  Blood work would suggest no significant inflammation or infection  CRP less than 3  ESR 17  WBC 6.3  Afebrile nontoxic-appearing  No plans for surgical intervention.  Patient should not need IV antibiotics for hallux osteomyelitis.  Most likely this is a chronic to the hallux from prior trauma possibly prior infections and warrants close monitoring but no at this time.    3.  Right hallux osteomyelitis  Similar to the left hallux there is no indication of an acute injury or infection.  Given the lack of supporting clinical or laboratory values this is a chronic on x-ray.  No plans for surgical intervention.  Patient should not need IV antibiotics for hallux osteomyelitis.  Most likely this is a chronic to the hallux from prior trauma possibly prior infections and warrants close monitoring but no at this time.    DISPO: Okay to DC on oral antibiotics.  Follow-up in our office for close monitoring and wound care.    Thanks for the opportunity to participate in this

## 2025-02-08 NOTE — DISCHARGE INSTRUCTIONS
Podiatry instructions:    Keep the left foot dry in the shower  Wash separately daily  Thin layer of the mupirocin ointment to the open wounds  Cover with gauze and rolled gauze  Wear surgical shoe when ambulating     Benoit Dominguez DPM  [facility]  [phone]

## 2025-02-08 NOTE — PROGRESS NOTES
Pt arrived to room 3127 from ed.  Vital signs taken and were WNL.  Admission and assessment completed. Pt oriented to room, bed, phone, and call light.  Fall precautions in place. Call light, bedside table and phone within easy reach.  Pt wife is at the bed side.  Pt has ulcers on both toes. He refused pt gown.  Pt states he hasn't really took care of himself and does what he wants. He also states the ulcer just appeared over night. Pt instructed to use call light to call for assistance.

## 2025-02-08 NOTE — DISCHARGE SUMMARY
V2.0  Discharge Summary    Name:  Ascencion Fulton /Age/Sex: 1975 (49 y.o. male)   Admit Date: 2025  Discharge Date: 25    MRN & CSN:  [id number] & [id number] Encounter Date and Time 25 6:00 PM EST    Attending:  Jayden Rose MD Discharging Provider: Jayden Rose MD       Hospital Course:     Brief HPI: Ascencion Fulton is a  49 y.o. male smoker with pmh of diabetes type 2, hypothyroidism who presents with vague complaints of \"not feeling well\" when he woke up on the day of presentation.  He reports some nausea and some subjective chills or chills but no vomiting, diarrhea, headaches, malaise or myalgias, cough, shortness of breath or chest pain.  Of significance, patient has had ulcerations over the left second third and fourth toes for about 2 weeks after wearing a new pair of shoes with resulting blisters which he popped.  Also, about a year ago he smashed his big toe with a sledgehammer but did not seek medical attention for it.     In the emergency room his temperature was 36.7, blood pressure 141/97, pulse 92, respirations 18, sats 97% on room air, BMI 30.20.    Brief Problem Based Course:   Diabetic foot infection, cellulitis, chronic hallux osteomyelitis  No surgical intervention recommended by podiatry, Augmentin plus Bactrim per ID for 2 weeks with close follow-up with podiatry    2..  Diabetes mellitus type 2-not on medications- has been non  noncompliant with follow-up--currently with acceptable glycemic control..  Ordered hemoglobin A1c but patient is not willing to stay in the hospital for result so unable to have provide recommendations for drug management--advised to follow-up with PCP ASAP      The patient expressed appropriate understanding of, and agreement with the discharge recommendations, medications, and plan.     Consults this admission:  PHARMACY TO DOSE VANCOMYCIN  IP CONSULT TO HOSPITALIST  IP CONSULT TO PODIATRY  IP CONSULT TO INFECTIOUS

## 2025-02-08 NOTE — CONSULTS
Clinical Pharmacy Note  Vancomycin Consult    Pharmacy consult received for one-time dose of vancomycin in the Emergency Department per ADRIANNA Mcmillan.    Ht Readings from Last 1 Encounters:   02/07/25 1.829 m (6')        Wt Readings from Last 1 Encounters:   02/07/25 101 kg (222 lb 10.6 oz)         Assessment/Plan:  Vancomycin 1500 mg x 1 in ED.  If vancomycin is to continue on admission and pharmacy is to manage dosing, please re-consult with admission orders.    Charan Caro, KalebD

## 2025-02-08 NOTE — PROGRESS NOTES
D: pt is eager to get home. ID has not yet come. wound culture hasn't been collected yet. pt said that he will follow up with Dr. Dominguez outpatient and dress wound as ordered. Dr. Dominguez asked him to follow up in 3 to 4 days outpatient and will send a triple antibiotic ointment to pt's pharmacy ([facility], [place] I told him that we are waiting on ID to prescribe oral antibiotics, but pt said that he needs to get home today. A: this RN sent secure message to Dr. Rose with pt's concerns R: will continue to monitor pt

## 2025-02-08 NOTE — PROGRESS NOTES
Dr. Rose said that pt has to wait for ID to come and if he cannot wait, then he will have to sign AMA papers.

## 2025-02-08 NOTE — PROGRESS NOTES
4 Eyes Skin Assessment     NAME:  Ascencion Fulton  YOB: 1975  MEDICAL RECORD NUMBER:  [id number]    The patient is being assessed for  Admission    I agree that at least one RN has performed a thorough Head to Toe Skin Assessment on the patient. ALL assessment sites listed below have been assessed.      Areas assessed by both nurses:    Head, Face, Ears, Shoulders, Back, Chest, Arms, Elbows, Hands, Sacrum. Buttock, Coccyx, Ischium, Legs. Feet and Heels, and Under Medical Devices         Does the Patient have a Wound? Yes wound(s) were present on assessment. LDA wound assessment was Initiated and completed by RN wound care consult in place       Car Prevention initiated by RN: no   Wound Care Orders initiated by RN: No    Pressure Injury (Stage 3,4, Unstageable, DTI, NWPT, and Complex wounds) if present, place Wound referral order by RN under : No    New Ostomies, if present place, Ostomy referral order under : No     Nurse 1 eSignature: {Esignature:[id number]}    **SHARE this note so that the co-signing nurse can place an eSignature**    Nurse 2 eSignature: {Esignature:[id number]}

## 2025-02-08 NOTE — CONSULTS
Infectious Diseases   Consult Note      Reason for Consult: bilateral hallux wounds   Requesting Physician: Dr. Rose     Date of Admission: 2/7/2025  Subjective:   CHIEF COMPLAINT: malaise, bilateral lower extremity wounds    HPI:   49-year-old male with history of type 2 diabetes, hypothyroidism that presented on 2/7 with general malaise.  The patient also had some subjective fevers and chills.  Upon presentation, WBC was 6.3 and he was afebrile.  He was empirically started on linezolid and pip-tazo.  He had noted ulcerations over the left second, third and fourth toes for the past 2 weeks since wearing a new pair of shoes that resulted in blisters in the area.  He also states that he had trauma to the big toe with a sledgehammer about a year ago but did not seek any medical attention for this.  An x-ray of the right foot showed suspected age-indeterminate erosive changes involving the tuft of the great toe phalanx and soft tissue swelling seen around the great toe.  An x-ray of the left foot showed erosion of the distal aspect of the great toe consistent with osteomyelitis.   ESR/CRP was 17/less than 3 respectively.  He was evaluated by podiatry and recommended no surgical intervention at this time.  Podiatry has recommended transition to oral antibiotics and continuation of wound care.                     Current abx: Linezolid, pip-tazo         Past Surgical History:       Diagnosis Date    Diabetes type 2, controlled (HCC)     Hypothyroidism     Nicotine abuse     Pulmonary nodule     Right shoulder pain     SOB (shortness of breath)          Procedure Laterality Date    CHOLESTEATOMA EXCISION      HAND SURGERY Right 2001    tendon surgery    KNEE SURGERY Right     reports 'replaced' patella       Social History:    TOBACCO:   reports that he has been smoking cigarettes. He started smoking about 40 years ago. He has a 40.1 pack-year smoking history. He has never used smokeless tobacco.  ETOH:   reports current  Diabetic foot infection (HCC)    Elevated blood pressure reading without diagnosis of hypertension    Uncontrolled type 2 diabetes mellitus with hyperglycemia (HCC)     Plan:   49-year-old male with history of type 2 diabetes, hypothyroidism that presented on 2/7 with general malaise.     Left foot ulcerations on 2-4th toes and concern for bilateral Hallux OM:  -Patient recently had new shoes and developed ulcerations with blistering of the left 2nd-4th toes, there is exposed fascia however no exposed tendon or bone.  The area with out any purulence and there is slight periwound edema.  Has been evaluated by podiatry and recommended no surgical intervention at this time.  Recommended continue mupirocin ointment daily.  -X-ray upon presentation of the right and left feet showing erosion at the distal aspect of the hallux bilaterally with concerns for possible osteomyelitis  -Patient is currently afebrile and without significant leukocytosis.  Inflammatory markers are also not elevated.  ESR is 17 and CRP less than 3 on presentation.  -Podiatry has evaluated and no planned surgical intervention at this time.  They have recommended close monitoring and oral antibiotics.  Likely seen chronic changes secondary to trauma in the area.   -Currently is on IV linezolid and pip-tazo.  - as no acute infectious symptoms can complete 2 week course of po bactrim and augmentin to cover for SSTI.   -Continue follow-up with outpatient podiatry, this should symptoms worsen to include worsening pain in the hallux, open wound with purulence, fevers or chills, would recommend reevaluation by infectious disease at that time.    DM2:  - Good glycemic control to aid in healing and prevention of further infections.  -A1c from this admission is pending    Antibiotic allergies:  - Patient is listed as allergic to ciprofloxacin in the form of anaphylaxis and cephalexin with bloody diarrhea     Medical Decision Making:  The following items were

## 2025-02-08 NOTE — ED PROVIDER NOTES
**ADVANCED PRACTICE PROVIDER, I HAVE EVALUATED THIS PATIENT**        [facility] EMERGENCY DEPARTMENT  EMERGENCY DEPARTMENT ENCOUNTER      Pt Name: Ascencion Fulton  MRN:[id number]  Birthdate 1975  Date of evaluation: 2/7/2025  Provider: ADRIANNA Bolton CNP  Note Started: 9:49 PM EST 2/7/25        Chief Complaint:    Chief Complaint   Patient presents with    Osteomyelitis      Dx with osteomyelitis in his left big toe at [facility] ED today. States that he has been having blisters on his left toes x 2-3 weeks. Hx of diabetes. Left AMA from [facility] after they wanted to admit him d/t childcare concerns.          Nursing Notes, Past Medical Hx, Past Surgical Hx, Social Hx, Allergies, and Family Hx were all reviewed and agreed with or any disagreements were addressed in the HPI.    HPI: (Location, Duration, Timing, Severity, Quality, Assoc Sx, Context, Modifying factors)    History From: Patient  Limitations to history : None    Social Determinants Significantly Affecting Health : None    Chief Complaint-    This is a  49 y.o. male with history of diabetes who presents to the emergency department for admission for osteomyelitis of the left foot.  Patient was seen at [facility] ED earlier today and left AGAINST MEDICAL ADVICE.  Patient states that for the last 2-week he has been having pain and blisters on the top of his left foot.  States he got antibiotics from urgent care and was told to rub cream on it but states that it has not been helping with the symptoms.  History of T2DM but states he does not to take medications anymore due to weight loss.  He has not been running fevers.  He reports a pain level of 9 out of 10, describes his pain as constant, dull, throbbing and burning.  Pain is worse with ambulation.  He has not taken anything for the symptoms.      PastMedical/Surgical History:      Diagnosis Date    Nicotine abuse     Pulmonary nodule     Right shoulder pain     SOB (shortness of breath)   he got antibiotics from urgent care and was told to rub cream on it but states that it has not been helping with the symptoms.  History of T2DM but states he does not to take medications anymore due to weight loss.  He has not been running fevers.  He reports a pain level of 9 out of 10, describes his pain as constant, dull, throbbing and burning.  Pain is worse with ambulation.  He has not taken anything for the symptoms.  Physical exam complete.  Patient arrives nontoxic, afebrile, mildly hypertensive with blood pressure 141/97.  GCS 15.  No signs or symptoms of acute distress noted.    Differential diagnoses include but not limited to osteomyelitis, sepsis, electrolyte derangement, RICARDA, dehydration, DKA    IV access established.  Laboratory studies pending.  Empiric IV antibiotic therapy ordered.    Left foot x-ray obtained at [facility] ED shows: Erosion of the distal aspect of the great toe metatarsal consistent with osteomyelitis.     POC glucose 190, remaining laboratory studies pending.    Labs resulted.  No significant electrolyte derangement or RICARDA.  Glucose 202.  No evidence of DKA.  There is no leukocytosis.  H&H is stable.  Sed rate 17, CRP less than 3.  Lactic within normal limits.    Patient reassessed multiple times throughout ED visit.  He has remained hemodynamically stable.  He is updated on test results.  Patient noted to have a wound on his right great toe.  He states that he dropped a heavy object on his toe couple months ago when working on his daughter's car.  He denies any pain to this area.  X-ray ordered and pending.  At this time there is no evidence of any life-threatening or emergent conditions requiring immediate intervention.  No evidence of sepsis or DKA.  Given patient's history and ED workup he will require admission for further evaluation and treatment.  Shared decision making employed and patient and family are in agreement with this plan of care.    Hospitalist consulted for

## 2025-02-08 NOTE — ED TRIAGE NOTES
Dx with osteomyelitis in his left big toe at [facility] ED today. States that he has been having blisters on his left toes x 2-3 weeks. Hx of diabetes. Left AMA from [facility] after they wanted to admit him d/t childcare concerns.

## 2025-02-08 NOTE — PLAN OF CARE
Problem: Chronic Conditions and Co-morbidities  Goal: Patient's chronic conditions and co-morbidity symptoms are monitored and maintained or improved  Outcome: Progressing     Problem: Discharge Planning  Goal: Discharge to home or other facility with appropriate resources  Outcome: Progressing  Flowsheets (Taken 2/8/2025 0139)  Discharge to home or other facility with appropriate resources: Identify barriers to discharge with patient and caregiver     Problem: Pain  Goal: Verbalizes/displays adequate comfort level or baseline comfort level  Outcome: Progressing     Problem: ABCDS Injury Assessment  Goal: Absence of physical injury  Outcome: Progressing  Flowsheets (Taken 2/8/2025 0126)  Absence of Physical Injury: Implement safety measures based on patient assessment

## 2025-02-08 NOTE — ED NOTES
.ED TO INPATIENT SBAR HANDOFF    Patient Name: Kadeem Fulton   Preferred Name: KADEEM  : 1975  49 y.o.   Family/Caregiver Present: no   Code Status Order: Full Code  PO Status: NPO:  Telemetry Order:   C-SSRS: Risk of Suicide: No Risk  Sitter no   Restraints:     Sepsis Risk Score      Situation  Chief Complaint   Patient presents with    Osteomyelitis      Dx with osteomyelitis in his left big toe at [facility] ED today. States that he has been having blisters on his left toes x 2-3 weeks. Hx of diabetes. Left AMA from [facility] after they wanted to admit him d/t childcare concerns.      Brief Description of Patient's Condition: Pt brought himself to be admitted for osteomyelitis of his left foot. Pt has history of DM.   Mental Status: oriented  Arrived from:Home  Imaging:   XR FOOT RIGHT (MIN 3 VIEWS)    (Results Pending)     Abnormal labs:   Abnormal Labs Reviewed   CBC WITH AUTO DIFFERENTIAL - Abnormal; Notable for the following components:       Result Value    Hematocrit 40.1 (*)     All other components within normal limits   COMPREHENSIVE METABOLIC PANEL W/ REFLEX TO MG FOR LOW K - Abnormal; Notable for the following components:    Glucose 202 (*)     All other components within normal limits   SEDIMENTATION RATE - Abnormal; Notable for the following components:    Sed Rate, Automated 17 (*)     All other components within normal limits   POCT GLUCOSE - Abnormal; Notable for the following components:    POC Glucose 190 (*)     All other components within normal limits       Background  Allergies:   Allergies   Allergen Reactions    Ciprofloxacin Anaphylaxis    Ketorolac Tromethamine Anaphylaxis    Cephalexin      Bloody diarrhea    Tramadol Hives    Ibuprofen Hives and Nausea And Vomiting    Other Nausea And Vomiting     History:   Past Medical History:   Diagnosis Date    Diabetes type 2, controlled (HCC)     Hypothyroidism     Nicotine abuse     Pulmonary nodule     Right shoulder pain     SOB

## 2025-02-08 NOTE — PROGRESS NOTES
Pt discharged to home with spouse. Iv removed. No telemetry in place. Pt educated on staying for A1C blood draw in order for physician to recommend how to care for diabetes. Pt refused to stay for this. Pt also refused waiting on a wheelchair to be taken down to discharge bridge by staff. Pt's spouse at bedside. This RN reviewed discharge paperwork with pt and pt's spouse. Pt dressed, took personal belongings and ambulated on own to exit the hospital. This RN sent pt with antibiotic ointment prescribed by Dr. Dominguez and some wound care supplies.

## 2025-02-08 NOTE — H&P
V2.0  History and Physical      Name:  Ascencion Fulton /Age/Sex: 1975  (49 y.o. male)   MRN & CSN:  [id number] & [id number] Encounter Date/Time: 2025 10:15 PM EST   Location:  [place] [address]/[facility] PCP: Claire Mcgarry APRN - NP       Hospital Day: 1    Assessment and Plan:   Ascencion Fulton is a 49 y.o. male smoker with a pmh of diabetes type 2, hypothyroidism who presents with Diabetic foot infection (HCC).    Hospital Problems             Last Modified POA    * (Principal) Diabetic foot infection (HCC) 2025 Yes    Uncontrolled diabetes type 2 with hyperglycemia (HCC) 2025 Yes    Elevated blood pressure reading without diagnosis of hypertension 2025 Yes       Plan:  Empiric Zosyn and Vanco, podiatry consult, ID consult, Monitor with as needed hydralazine  Mild dose insulin sliding scale before every meal and at bedtime and check glycosylated hemoglobin, fasting lipids and TSH  Resume home regimen for chronic stable conditions    Disposition:   Current Living situation: Home  Expected Disposition: Home  Estimated D/C: 4 days    Diet ADULT DIET; Regular; 4 carb choices (60 gm/meal); Low Fat/Low Chol/High Fiber/KONRAD   DVT Prophylaxis [x] Lovenox, []  Heparin, [] SCDs, [] Ambulation,  [] Eliquis, [] Xarelto, [] Coumadin   Code Status Full Code   Surrogate Decision Maker/ POA Domestic partner     Personally reviewed Lab Studies and Imaging     Discussed management of the case with ED provider who recommended admission.    EKG interpreted personally and results n/a.    Imaging that was interpreted personally includes left foot x-ray and results erosion of the distal phalanx of the left big toe, soft tissue swelling of the rest of the toes    Drugs that require monitoring for toxicity include Vanco and the method of monitoring was trough.        History from:     patient    History of Present Illness:     Chief Complaint: \"Not feeling well\"  Ascencion Fulton is a 49 y.o. male smoker with pmh of diabetes type

## 2025-02-08 NOTE — DISCHARGE INSTR - COC
Continuity of Care Form    Patient Name: Ascencion Fulton   :  1975  MRN:  [id number]    Admit date:  2025  Discharge date:  25    Code Status Order: Full Code   Advance Directives:   Advance Care Flowsheet Documentation             Admitting Physician:  Yvette Ybarra MD  PCP: Claire Mcgarry, APRN - NP    Discharging Nurse: Kait Cohen RN  Discharging Hospital Unit/Room#: A9H-3904/3127-01  Discharging Unit Phone Number: [phone]    Emergency Contact:   Extended Emergency Contact Information  Primary Emergency Contact: GLORIA BOSCH  Home Phone: [phone]  Work Phone: [phone]  Mobile Phone: [phone]  Relation: Domestic Partner   needed? No    Past Surgical History:  Past Surgical History:   Procedure Laterality Date    CHOLESTEATOMA EXCISION      HAND SURGERY Right 2001    tendon surgery    KNEE SURGERY Right     reports 'replaced' patella       Immunization History:     There is no immunization history on file for this patient.    Active Problems:  Patient Active Problem List   Diagnosis Code    Diabetic foot infection ([facility]) E11.628, L08.9    Elevated blood pressure reading without diagnosis of hypertension R03.0    Uncontrolled type 2 diabetes mellitus with hyperglycemia ([facility]) E11.65       Isolation/Infection:   Isolation            No Isolation          Patient Infection Status       Infection Onset Added Last Indicated Last Indicated By Review Planned Expiration Resolved Resolved By    C-diff Rule Out 22 Clostridium Difficile Toxin/Antigen                Nurse Assessment:  Last Vital Signs: BP (!) 140/84   Pulse 79   Temp 98.1 °F (36.7 °C) (Oral)   Resp 17   Wt 101 kg (222 lb 10.6 oz)   SpO2 96%   BMI 30.20 kg/m²     Last documented pain score (0-10 scale): Pain Level: 0  Last Weight:   Wt Readings from Last 1 Encounters:   25 101 kg (222 lb 10.6 oz)     Mental Status:  oriented and alert    IV Access:  - None    Nursing

## 2025-02-09 LAB
BACTERIA BLD CULT ORG #2: NORMAL
BACTERIA BLD CULT: NORMAL

## 2025-02-12 LAB
BACTERIA BLD CULT ORG #2: NORMAL
BACTERIA BLD CULT: NORMAL

## 2025-07-01 ENCOUNTER — HOSPITAL ENCOUNTER (EMERGENCY)
Age: 50
Discharge: HOME | End: 2025-07-01
Payer: COMMERCIAL

## 2025-07-01 VITALS
DIASTOLIC BLOOD PRESSURE: 100 MMHG | HEIGHT: 72 IN | SYSTOLIC BLOOD PRESSURE: 152 MMHG | WEIGHT: 220.68 LBS | RESPIRATION RATE: 18 BRPM | TEMPERATURE: 98.4 F | OXYGEN SATURATION: 99 % | HEART RATE: 98 BPM | BODY MASS INDEX: 29.89 KG/M2

## 2025-07-01 DIAGNOSIS — B95.8 STAPH INFECTION: Primary | ICD-10-CM

## 2025-07-01 DIAGNOSIS — I10 ELEVATED BLOOD PRESSURE READING WITH DIAGNOSIS OF HYPERTENSION: ICD-10-CM

## 2025-07-01 PROCEDURE — 99283 EMERGENCY DEPT VISIT LOW MDM: CPT

## 2025-07-01 RX ORDER — MUPIROCIN 2 %
OINTMENT (GRAM) TOPICAL
Qty: 60 G | Refills: 0 | Status: SHIPPED | OUTPATIENT
Start: 2025-07-01 | End: 2025-07-08

## 2025-07-01 RX ORDER — DOXYCYCLINE HYCLATE 100 MG
100 TABLET ORAL 2 TIMES DAILY
Qty: 20 TABLET | Refills: 0 | Status: SHIPPED | OUTPATIENT
Start: 2025-07-01 | End: 2025-07-11

## 2025-07-01 RX ORDER — AMLODIPINE BESYLATE 5 MG/1
5 TABLET ORAL DAILY
Qty: 30 TABLET | Refills: 2 | Status: SHIPPED | OUTPATIENT
Start: 2025-07-01

## 2025-07-02 NOTE — DISCHARGE INSTRUCTIONS
Take prescribed medication as prescribed only  Follow-up with infectious disease Dr. Sommers  Follow-up with your primary care provider  Follow-up with dermatologist  Return emergency room for any worsening     your prescriptions at the [facility] on [address]

## 2025-07-02 NOTE — ED TRIAGE NOTES
Pt presents to ED with a c/o rash w/ pruritus for the past 3-4 days. Pt states it started on legs, spread to torso, and has now spread to neck under chin that he noticed this morning. Pt states it started as 1 rash on left leg, scratched it, and then said it started to spread. Pt had osteomyelitis of left foot in February. Hx of cellulitis 3 years ago. Pt reports being prescribed new medications by [facility] that he has not started to take yet.

## 2025-07-02 NOTE — ED PROVIDER NOTES
**ADVANCED PRACTICE PROVIDER, I HAVE EVALUATED THIS PATIENT**        [facility] EMERGENCY DEPARTMENT  EMERGENCY DEPARTMENT ENCOUNTER      Pt Name: Ascencion Fulton  MRN:[id number]  Birthdate 1975  Date of evaluation: 7/1/2025  Provider: Carmelo Wong PA-C  Note Started: 9:20 PM EDT 7/1/25        Chief Complaint:    Chief Complaint   Patient presents with    Rash     Pt presents to ED with a c/o rash w/ pruritus for the past 3-4 days. Pt states it started on legs, spread to torso, and has now spread to neck under chin that he noticed this morning. Pt states it started as 1 rash on left leg, scratched it, and then said it started to spread.  Pt had osteomyelitis of left foot in February.          Nursing Notes, Past Medical Hx, Past Surgical Hx, Social Hx, Allergies, and Family Hx were all reviewed and agreed with or any disagreements were addressed in the HPI.    HPI: (Location, Duration, Timing, Severity, Quality, Assoc Sx, Context, Modifying factors)    History From: ***  {Limitations to history (Optional):20299}    {Social Determinants Significantly Affecting Health (Optional):66861}    Chief Complaint of ***    This is a  49 y.o. male who presents  ***    PastMedical/Surgical History:      Diagnosis Date    Diabetes type 2, controlled (HCC)     Hypothyroidism     Nicotine abuse     Pulmonary nodule     Right shoulder pain     SOB (shortness of breath)          Procedure Laterality Date    CHOLESTEATOMA EXCISION      HAND SURGERY Right 2001    tendon surgery    KNEE SURGERY Right     reports 'replaced' patella       Medications:  Previous Medications    No medications on file       Review of Systems:  Review of Systems    \"Positives and Pertinent negatives as per HPI\"    Physical Exam:  Physical Exam    MEDICAL DECISION MAKING    Vitals:    Vitals:    07/01/25 2035 07/01/25 2126   BP: (!) 156/114 (!) 152/100   Pulse: (!) 107 98   Resp: 16 18   Temp: 98.4 °F (36.9 °C)    TempSrc: Oral    SpO2: 100%  him on amlodipine 5 mg once a day for his blood pressure.  He is to keep an eye on his blood pressure.  Get established with a primary care provider.  I will also give him a dermatology referral if rash persists or worsens.  He understood and he is okay with this plan.  He will be discharged stable condition.      Disposition Considerations (Tests not ordered but considered, Shared Decision Making, Pt Expectation of Test or Tx.):     See discussion above          The patient tolerated their visit well.  I evaluated the patient.  The physician was available for consultation as needed.  The patient and / or the family were informed of the results of any tests, a time was given to answer questions, a plan was proposed and they agreed with plan.     I am the Primary Clinician of Record.     CLINICAL IMPRESSION:  1. Staph infection    2. Elevated blood pressure reading with diagnosis of hypertension        DISPOSITION Decision To Discharge 07/01/2025 09:21:35 PM   DISPOSITION CONDITION Stable           PATIENT REFERRED TO:  Claire Mcgarry, APRN - NP  [address] [facility] [address] [phone]    Call in 1 day  For follow-up for blood pressure    Bhumi Sommers MD  [address]  [place] [address]  [phone]    Call in 1 day  For follow-up for possible MRSA staph infection    [facility]  [address]  [place] [facility] [address]  [phone]  Call in 1 day  As needed, If symptoms worsen      DISCHARGE MEDICATIONS:  New Prescriptions    AMLODIPINE (NORVASC) 5 MG TABLET    Take 1 tablet by mouth daily    DOXYCYCLINE HYCLATE (VIBRA-TABS) 100 MG TABLET    Take 1 tablet by mouth 2 times daily for 10 days    MUPIROCIN (BACTROBAN) 2 % OINTMENT    Apply topically 3 times daily.       DISCONTINUED MEDICATIONS:  Discontinued Medications    No medications on file              (Please note the MDM and HPI sections of this note were completed with a voice

## 2025-07-02 NOTE — ED NOTES
Patient DCed from ED at this time. Discussed AVS, follow up, and scripts. They verbalized understanding. Reinforced that should symptoms persist or worsen to return to the ED. They verbalized understanding. Patient ambulated out of ED. RN thanked patient for choosing [facility].

## 2025-07-09 ASSESSMENT — ENCOUNTER SYMPTOMS
BACK PAIN: 0
VOMITING: 0
EYE PAIN: 0
ABDOMINAL PAIN: 0
NAUSEA: 0
COUGH: 0
SHORTNESS OF BREATH: 0
SORE THROAT: 0